# Patient Record
(demographics unavailable — no encounter records)

---

## 2017-01-01 NOTE — DS
- Maternal History


Mother's Age: 30


 Status: 


Mother's Blood Type: O(+)


HBSAG: Negative


Date: 16


RPR: Negative


Date: 16


Group B Strep: Negative


HIV: Negative





- Maternal Risks


OB Risks: Previous ; h/o hypothyroid, hpv, asthma





[place] Data





- Admission


Date of Admission: 08/10/17


Admission Time: 15:


Date of Delivery: 08/10/17


Time of Delivery: 15:13


Wks Gestation by Dates: 39.1


Wks Gestation by Sono: 39.1


Infant Gender: Female


Type of Delivery: Repeat C/S


Apgar Score @1 Minute: 9


Apgar score @ 5 Minutes: 9


Birth Weight: 6 lb 3 oz


Birth Length: 18.5 in


Head Circumference, Admission: 34


Chest Circumference: 31.5


Abdominal Girth: 28





- Vital Signs


  ** Right Lower Arm


Blood Pressure: 75/49


Blood Pressure Mean: 57





  ** Right Calf


Blood Pressure: 61/42


Blood Pressure Mean: 48





  ** Left Lower Arm


Blood Pressure: 61/40


Blood Pressure Mean: 47





  ** Left Calf


Blood Pressure: 67/40


Blood Pressure Mean: 49





- Hearing Screen


Left Ear: Passed


Right Ear: Passed


Hearing Screen Complete: 17





- Labs


Labs: 


 Transcutaneous Bilirubin











Transcutaneous Bilirubin       17





performed                      


 


Transcutaneous Bilirubin       7.0





result                         











 Baby's Blood Type, Chloé











Cord Blood Type  O POSITIVE   08/10/17  15:15    


 


PARAMJIT, Poly Interpret  Negative  (NEGATIVE)   08/10/17  15:15    








 Laboratory Tests











  08/10/17 08/10/17





  15:15 15:44


 


POC Glucometer   [id number]


 


Cord Blood Type  O POSITIVE 


 


PARAMJIT, Poly Interpret  Negative 














- [facility] Screening


 Screening Card Number: [id number]





- Hepatitis B Vaccine Given


Date: 


8/10/17





[place] PE, Discharge





- Physical Exam


Last Weight Documented: 6 lb


Vital Signs: 


 Vital Signs











Temperature  98.2 F   17 07:40


 


Pulse Rate  166 H  08/10/17 15:26


 


Respiratory Rate  56   08/10/17 15:26


 


Blood Pressure  75/49   17 10:15


 


O2 Sat by Pulse Oximetry (%)  97   08/10/17 15:26








 SpO2





Preductal SpO2, Right Arm        98


Postductal SpO2 [Left Leg]       100








General Appearance: Yes: No Abnormalities


Skin: Yes: No Abnormalities


Head: Yes: No Abnormalities


Eyes: Yes: No Abnormalities


Ears: Yes: No Abnormalities


Nose: Yes: No Abnormalities


Mouth: Yes: No Abnormalities


Chest: Yes: No Abnormalities


Lungs/Respiratory: Yes: No Abnormalities


Cardiac: Yes: No Abnormalities


Abdomen: Yes: No Abnormalities


Gastrointestinal: Yes: No Abnormalities


Genitalia: No Abnormalities


Genitalia, Female: Yes: Labia Normal


Anus: Yes: No Abnormalities


Extremities: Yes: No Abnormalities


Spine: Yes: No Abnormalities


Reflexes: Giovanni: Present, Rooting: Present, Sucking: Present


Neuro: Yes: No Abnormalities


Cry: Yes: No Abnormalities


Preductal SpO2, Right Arm: 98


  ** Left Leg


Postductal SpO2: 100





Problem List





- Problems


(1) Single liveborn, born in hospital, delivered by  section


Assessment/Plan: 


Patient is a well . Continue routine care.


Feed as tolerated and on demand.


Call office for any further questions.


Code(s): Z38.01 - SINGLE LIVEBORN INFANT, DELIVERED BY 








Discharge Summary


Reason For Visit: 


Current Active Problems





Single liveborn, born in hospital, delivered by  section (Acute) 








Condition: Good





- Instructions


Diet, Activity, Other Instructions: 


The baby has its first appointment to see 


Manda Biswas, and Jose Eduardo at 


[address] [place] [address] 


([phone]) on  at 930 am

## 2017-01-01 NOTE — CONSULT
- Maternal History


Mother's Age: 30


 Status: 


Mother's Blood Type: O(+)


HBSAG: Negative


Date: 16


RPR: Negative


Date: 16


Group B Strep: Negative


HIV: Negative


Other: Rubella Immune, Quantiferon negative





- Maternal Risks


OB Risks: Previous ; h/o hypothyroid, hpv, asthma





 Data





- Admission


Date of Admission: 08/10/17


Admission Time: 15:26


Date of Delivery: 08/10/17


Time of Delivery: 15:13


Wks Gestation by Dates: 39.1


Wks Gestation by Sono: 39.1


Infant Gender: Female


Type of Delivery: Repeat C/S


Apgar Score @1 Minute: 9


Apgar score @ 5 Minutes: 9


Birth Weight: 2.807 kg


Birth Length: 46.99 cm


Head Circumference, Admission: 34


Chest Circumference: 31.5


Abdominal Girth: 28





- [facility] Screening


[place] Screening Card Number: [id number]





Level 2, History and Physical


[place] History: 


39wk AGA female born via repeat . Infant born vigorous, cried 

immediately. Brought to warmer and routine DR care given. APGARs 9/9 at 1/5 

minutes. 





Pregnancy complicated by hypothyroid (Synthroid 50mcg QD), asthma (ventolin PRN)

, HPV. 





- [place] Infant


Birth Weight: 2.807 kg


Birth Length: 46.99 cm


Vital Signs: 


 Vital Signs











Temperature  36.8 C   08/10/17 21:02


 


Pulse Rate  166 H  08/10/17 15:26


 


Respiratory Rate  56   08/10/17 15:26


 


Blood Pressure      


 


O2 Sat by Pulse Oximetry (%)  97   08/10/17 15:26











Chest Circumference: 31.5


General Appearance: Yes: No Abnormalities, Full ROM, Spontaneous movements, Pink


Skin: Yes: No Abnormalities, Vernix


Head: Yes: No Abnormalities


Eyes: Yes: No Abnormalities, Clear


Ears: Yes: No Abnormalities, Symmetrical


Nose: Yes: No Abnormalities, Nares patent


Mouth: Yes: No Abnormalities


Chest: Yes: No Abnormalities, Symmetrical


Lungs/Respiratory: Yes: No Abnormalities, Clear, Bilateral good air entry


Cardiac: Yes: No Abnormalities, S1, S2


Abdomen: Yes: No Abnormalities, Umb Ves, 2 artery 1 vein


Gastrointestinal: Yes: No Abnormalities


Genitalia: No Abnormalities


Genitalia, Female: Yes: Labia Normal


Anus: Yes: No Abnormalities, Patent


Extremities: Yes: No Abnormalities, 10 Fingers, 10 Toes


Spine: Yes: No Abnormalities


Neuro: Yes: No Abnormalities, Alert, Active


Cry: Yes: No Abnormalities, Strong





Assessment/Plan


FT, AGA female well baby born via repeat  to mother with pregnancy 

complicated by hypothyroid (synthroid 50mcg QD), asthma (ventolin PRN), and 

HPV. 


Plan:


routine  care


encrouage breastfeeding with mother

## 2017-01-01 NOTE — PN
[place], Progress Note





- [place] Exam


Weight: 5 lb 13 oz


Chest Circumference: 31.5


Head Circumference: 34


Vital Signs: 


 Vital Signs











Temperature  98.1 F   17 09:16


 


Pulse Rate  166 H  08/10/17 15:26


 


Respiratory Rate  56   08/10/17 15:26


 


Blood Pressure  75/49   17 10:15


 


O2 Sat by Pulse Oximetry (%)  97   08/10/17 15:26











General Appearance: Yes: No Abnormalities


Skin: Yes: No Abnormalities


Head: Yes: No Abnormalities


Eyes: Yes: No Abnormalities


Ears: Yes: No Abnormalities


Nose: Yes: No Abnormalities


Mouth: Yes: No Abnormalities


Chest: Yes: No Abnormalities


Lungs/Respiratory: Yes: No Abnormalities


Cardiac: Yes: No Abnormalities


Abdomen: Yes: No Abnormalities


Gastrointestinal: Yes: No Abnormalities


Genitalia: No Abnormalities


Genitalia, Female: Yes: Labia Normal


Anus: Yes: No Abnormalities


Extremities: Yes: No Abnormalities


Ching Test: Negative


Ortolani Test: Negative


Femoral Pulse: Strong


Spine: Yes: No Abnormalities


Reflexes: Labelle: Present, Rooting: Present, Sucking: Present


Neuro: Yes: No Abnormalities


Cry: No Abnormalities





- Other Data/Findings


Labs, Other Data: 


 Output





Number of Voids                  0


Number of Voids                  1


Number of Voids                  0


Number of Voids                  1


Number of Voids                  1


Stool Size                       Small


Stool Size                       Moderate


 Stool Description        Seedy


[place] Stool Description        Yellow





 Baby's Blood Type, Chloé











Cord Blood Type  O POSITIVE   08/10/17  15:15    


 


PARAMJIT, Poly Interpret  Negative  (NEGATIVE)   08/10/17  15:15    











Other Findings/Remarks: 


Well  Girl


Continue Current Care








Problem List





- Problems


(1) Single liveborn, born in hospital, delivered by  section


Code(s): Z38.01 - SINGLE LIVEBORN INFANT, DELIVERED BY

## 2017-01-01 NOTE — PN
[place], Progress Note





- [place] Exam


Weight: 5 lb 14 oz


Chest Circumference: 31.5


Head Circumference: 34


Vital Signs: 


 Vital Signs











Temperature  98.0 F   17 21:00


 


Pulse Rate  166 H  08/10/17 15:26


 


Respiratory Rate  56   08/10/17 15:26


 


Blood Pressure  75/49   17 10:15


 


O2 Sat by Pulse Oximetry (%)  97   08/10/17 15:26











General Appearance: Yes: No Abnormalities


Skin: Yes: No Abnormalities


Head: Yes: No Abnormalities


Eyes: Yes: No Abnormalities


Ears: Yes: No Abnormalities


Nose: Yes: No Abnormalities


Mouth: Yes: No Abnormalities


Chest: Yes: No Abnormalities


Lungs/Respiratory: Yes: No Abnormalities


Cardiac: Yes: No Abnormalities


Abdomen: Yes: No Abnormalities


Gastrointestinal: Yes: No Abnormalities


Genitalia: No Abnormalities


Genitalia, Female: Yes: Labia Normal


Anus: Yes: No Abnormalities


Extremities: Yes: No Abnormalities


Ching Test: Negative


Ortolani Test: Negative


Femoral Pulse: Strong


Spine: Yes: No Abnormalities


Reflexes: Huntley: Present, Rooting: Present, Sucking: Present


Neuro: Yes: No Abnormalities


Cry: No Abnormalities





- Other Data/Findings


Labs, Other Data: 


 Intake





Intake, Oral Amount              30


Intake, Oral Amount              20


Intake, Oral Amount              25


Intake, Oral Amount              30


Intake, Oral Amount              22


Intake, Oral Amount              30





 Output





Number of Voids                  1


Number of Voids                  1


Output, Urine Amount             0


Output, Urine Amount             1


Output, Urine Amount             0


Output, Urine Amount             1


Output, Urine Amount             1


Stool Size                       Small


Stool Size                       Small


Stool Size                       Moderate


Stool Size                       Moderate


 Stool Description        Green,Pasty


 Stool Description        Brown-Black,Pasty


 Stool Description        Green


[place] Stool Description        Green





 Baby's Blood Type, Chloé











Cord Blood Type  O POSITIVE   08/10/17  15:15    


 


PARAMJIT, Poly Interpret  Negative  (NEGATIVE)   08/10/17  15:15    














Problem List





- Problems


(1) Single liveborn, born in hospital, delivered by  section


Assessment/Plan: 


Patient is a well . Continue routine care.


Code(s): Z38.01 - SINGLE LIVEBORN INFANT, DELIVERED BY

## 2017-01-01 NOTE — HP
- Maternal History


Mother's Age: 30


 Status: 


Mother's Blood Type: O(+)


HBSAG: Negative


Date: 16


RPR: Negative


Date: 16


Group B Strep: Negative


HIV: Negative





- Maternal Risks


OB Risks: Previous ; h/o hypothyroid, hpv, asthma





[place] Data





- Admission


Date of Admission: 08/10/17


Admission Time: 15:


Date of Delivery: 08/10/17


Time of Delivery: 15:13


Wks Gestation by Dates: 39.1


Wks Gestation by Sono: 39.1


Infant Gender: Female


Type of Delivery: Repeat C/S


Apgar Score @1 Minute: 9


Apgar score @ 5 Minutes: 9


Birth Weight: 6 lb 3 oz


Birth Length: 18.5 in


Head Circumference, Admission: 34


Chest Circumference: 31.5


Abdominal Girth: 28





- Vital Signs


  ** Right Lower Arm


Blood Pressure: 75/49


Blood Pressure Mean: 57





  ** Right Calf


Blood Pressure: 61/42


Blood Pressure Mean: 48





  ** Left Lower Arm


Blood Pressure: 61/40


Blood Pressure Mean: 47





  ** Left Calf


Blood Pressure: 67/40


Blood Pressure Mean: 49





- Labs


Labs: 


 Baby's Blood Type, Chloé











Cord Blood Type  O POSITIVE   08/10/17  15:15    


 


PARAMJIT, Poly Interpret  Negative  (NEGATIVE)   08/10/17  15:15    














- [facility] Screening


 Screening Card Number: [id number]





[place] Infant, Physical Exam





- [place] Infant, Admission Exam


Birth Weight: 6 lb 3 oz


Birth Length: 18.5 in


Chest Circumference: 31.5


Initial Vital Signs: 


 Initial Vital Signs











Temp Pulse Resp Pulse Ox


 


 98.2 F   166 H  56   97 


 


 08/10/17 15:26  08/10/17 15:26  08/10/17 15:26  08/10/17 15:26











General Appearance: Yes: No Abnormalities


Skin: Yes: No Abnormalities


Head: Yes: No Abnormalities


Eyes: Yes: No Abnormalities


Ears: Yes: No Abnormalities


Nose: Yes: No Abnormalities


Mouth: Yes: No Abnormalities


Chest: Yes: No Abnormalities


Lungs/Respiratory: Yes: No Abnormalities


Cardiac: Yes: No Abnormalities


Abdomen: Yes: No Abnormalities


Gastrointestinal: Yes: No Abnormalities


Genitalia: No Abnormalities


Genitalia, Female: Yes: Labia Normal


Anus: Yes: No Abnormalities


Extremities: Yes: No Abnormalities


Clavicles: No abnormalities


Femoral Pulse: Strong


Ortolani Test: Negative


Ching Test: Negative


Spine: Yes: No Abnormalities


Reflexes: [place]: Present, Rooting: Present, Sucking: Present


Neuro: Yes: No Abnormalities


Cry: Yes: No Abnormalities





- Other Findings/Remarks


Other Findings/Remarks: 


Well  Girl


Repeat C/Section


GBS -


Continue Current Care





Problem List





- Problems


(1) Single liveborn, born in hospital, delivered by  section


Code(s): Z38.01 - SINGLE LIVEBORN INFANT, DELIVERED BY